# Patient Record
Sex: MALE | Race: WHITE
[De-identification: names, ages, dates, MRNs, and addresses within clinical notes are randomized per-mention and may not be internally consistent; named-entity substitution may affect disease eponyms.]

---

## 2020-07-22 ENCOUNTER — HOSPITAL ENCOUNTER (EMERGENCY)
Dept: HOSPITAL 39 - ER | Age: 70
Discharge: HOME | End: 2020-07-22
Payer: MEDICARE

## 2020-07-22 VITALS — SYSTOLIC BLOOD PRESSURE: 166 MMHG | DIASTOLIC BLOOD PRESSURE: 88 MMHG | TEMPERATURE: 97.9 F | OXYGEN SATURATION: 96 %

## 2020-07-22 DIAGNOSIS — R33.9: Primary | ICD-10-CM

## 2020-07-22 NOTE — ED.PDOC
History of Present Illness





- General


Chief Complaint:  Problem


Stated Complaint: difficulty with urination


Time Seen by Provider: 07/22/20 16:42


Source: patient


Exam Limitations: no limitations





- History of Present Illness


Initial Comments: 





68 y/o male has had increasing difficulty emptying his bladder.  Sometimes it 

takes 2 hrs in the morning to empty.  This morning he didn't urinate at all .


Timing/Duration: other - all day


Quality: severe, steady


Onset Location: suprapubic


Radiation: none


Activites at Onset: rest


Improving Factors: nothing


Worsening Factors: nothing


Associated Symptoms: abdominal pain


Allergies/Adverse Reactions: 


Allergies





NO KNOWN ALLERGY Allergy (Verified 07/22/20 16:47)


   








Review of Systems





- Review of Systems


Constitutional: States: no symptoms reported


EENTM: States: no symptoms reported


Respiratory: States: no symptoms reported


Cardiology: States: no symptoms reported


Gastrointestinal/Abdominal: States: no symptoms reported


Genitourinary: States: see HPI


Musculoskeletal: States: no symptoms reported


Skin: States: no symptoms reported


Neurological: States: no symptoms reported





Physical Exam





- Physical Exam


General Appearance: Alert, No apparent distress, Obese


Eyes, Ears, Nose, Throat Exam: PERRL/EOMI, normal ENT inspection


Neck: non-tender, full range of motion, supple


Cardiovascular/Respiratory: regular rate, rhythm, no M/R/G, normal breath 

sounds, no respiratory distress


Gastrointestinal/Abdominal: other - suprapubic fullness


Male Genital Exam: normal genitalia


Back Exam: no CVA tenderness


Extremity: normal range of motion, pedal edema


Neurologic: alert, normal mood/affect, oriented x 3


Skin Exam: normal color





Progress





- Results/Orders


Results/Orders: 





bassett bag has 1000 ml reddish urine





Departure





- Departure


Clinical Impression: 


 Acute urinary retention





Disposition: Discharge to Home or Self Care


Condition: Good


Departure Forms:  ED Discharge - Pt. Copy, Patient Portal Self Enrollment


Instructions:  DI for Urinary Retention in Men


Referrals: 


Tristin Pride MD [Primary Care Provider] - 1-2 Weeks

## 2020-07-24 ENCOUNTER — HOSPITAL ENCOUNTER (OUTPATIENT)
Dept: HOSPITAL 39 - GMAM | Age: 70
End: 2020-07-24
Attending: FAMILY MEDICINE
Payer: MEDICARE

## 2020-07-24 DIAGNOSIS — R33.9: Primary | ICD-10-CM

## 2020-08-05 ENCOUNTER — HOSPITAL ENCOUNTER (OUTPATIENT)
Dept: HOSPITAL 39 - ECHO | Age: 70
End: 2020-08-05
Attending: FAMILY MEDICINE
Payer: MEDICARE

## 2020-08-05 DIAGNOSIS — R60.0: ICD-10-CM

## 2020-08-05 DIAGNOSIS — I34.0: Primary | ICD-10-CM

## 2020-08-05 DIAGNOSIS — I50.30: ICD-10-CM

## 2020-08-05 DIAGNOSIS — I51.7: ICD-10-CM

## 2020-08-06 ENCOUNTER — HOSPITAL ENCOUNTER (OUTPATIENT)
Dept: HOSPITAL 39 - GMA MATASK | Age: 70
End: 2020-08-06
Attending: FAMILY MEDICINE
Payer: MEDICARE

## 2020-08-06 DIAGNOSIS — I10: Primary | ICD-10-CM
